# Patient Record
Sex: FEMALE | Race: WHITE
[De-identification: names, ages, dates, MRNs, and addresses within clinical notes are randomized per-mention and may not be internally consistent; named-entity substitution may affect disease eponyms.]

---

## 2017-08-12 ENCOUNTER — HOSPITAL ENCOUNTER (EMERGENCY)
Dept: HOSPITAL 26 - MED | Age: 47
Discharge: HOME | End: 2017-08-12
Payer: COMMERCIAL

## 2017-08-12 VITALS — SYSTOLIC BLOOD PRESSURE: 114 MMHG | DIASTOLIC BLOOD PRESSURE: 71 MMHG

## 2017-08-12 VITALS — DIASTOLIC BLOOD PRESSURE: 68 MMHG | SYSTOLIC BLOOD PRESSURE: 102 MMHG

## 2017-08-12 VITALS — BODY MASS INDEX: 25.55 KG/M2 | WEIGHT: 159 LBS | HEIGHT: 66 IN
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
## 2017-08-12 DIAGNOSIS — S61.211D: Primary | ICD-10-CM

## 2019-10-14 ENCOUNTER — HOSPITAL ENCOUNTER (EMERGENCY)
Dept: HOSPITAL 26 - MED | Age: 49
Discharge: HOME | End: 2019-10-14
Payer: COMMERCIAL

## 2019-10-14 VITALS — SYSTOLIC BLOOD PRESSURE: 117 MMHG | DIASTOLIC BLOOD PRESSURE: 71 MMHG

## 2019-10-14 VITALS — SYSTOLIC BLOOD PRESSURE: 150 MMHG | DIASTOLIC BLOOD PRESSURE: 67 MMHG

## 2019-10-14 VITALS — BODY MASS INDEX: 33.28 KG/M2 | WEIGHT: 169.5 LBS | HEIGHT: 60 IN

## 2019-10-14 DIAGNOSIS — R51: Primary | ICD-10-CM

## 2019-10-14 PROCEDURE — 96372 THER/PROPH/DIAG INJ SC/IM: CPT

## 2019-10-14 PROCEDURE — 99284 EMERGENCY DEPT VISIT MOD MDM: CPT

## 2019-10-14 PROCEDURE — 70450 CT HEAD/BRAIN W/O DYE: CPT

## 2019-10-14 NOTE — NUR
PT ARRIVED TO ED C/O HA X THIS AM. DESECIBES IT AS PRESSURE AND RATES PAIN AT A 
8/10. NO OBVIOUS DEFORMITY NOTED. DENIES ANY TRUAMA OR INJURY. VSS. PT HAS 
STEADY GAIT. PT SATES SOME BLURRY VISION BUT CAN STILL SEE. PERRLA 3MM.  

PATIENT POSITIONED FOR COMFORT; HOB ELEVATED; BEDRAILS UP X2; BED DOWN. ER MD 
MADE AWARE OF PT STATUS.





NKA. 



DENIES ANY PMH.

## 2019-10-14 NOTE — NUR
Patient discharged with v/s stable. Written and verbal after care instructions 
given and explained. Patient alert, oriented and verbalized understanding of 
instructions. Ambulatory with steady gait. All questions addressed prior to 
discharge. ID band removed. Patient advised to follow up with PMD. Rx of 
Ibuprofen given. Patient educated on indication of medication including 
possible reaction and side effects. Opportunity to ask questions provided and 
answered.